# Patient Record
(demographics unavailable — no encounter records)

---

## 2025-04-23 NOTE — REVIEW OF SYSTEMS
[Negative] : Allergic/Immunologic [FreeTextEntry3] : green nasal discharge and congestion for 6 days

## 2025-04-23 NOTE — BEGINNING OF VISIT
[0] : 2) Feeling down, depressed, or hopeless: Not at all (0) [Former] : Former [20 or more] : 20 or more [Reviewed updated] : Reviewed updated [de-identified] : smoked for 20 years

## 2025-04-23 NOTE — PHYSICAL EXAM
[Fully active, able to carry on all pre-disease performance without restriction] : Status 0 - Fully active, able to carry on all pre-disease performance without restriction [Cachectic] : cachectic [Normal] : affect appropriate [de-identified] : nasal turbinates red with green nasal discharge  [de-identified] : R cervical LN palpable

## 2025-04-23 NOTE — HISTORY OF PRESENT ILLNESS
[de-identified] : Mrs.Luzelena Martínez is a 64 year old female who presents to the office today for pancytopenia, referred . The patient was getting her annual checked up and labs done at Dr.Mitchell Driscoll's office. The patient's labs were drawn on 2021 revealed: Hgb: 8.8, HCT: 28.5, WBC: 1.25, Plt: 144. The patient can not recall the last time she had a check up.  placed the patient on oral iron 1 month ago due to the her low Hgb.  Patient denies shortness of breath, dizziness, nausea, more fatigue than usual, bleeding disorders or vomiting. 15 lb weight loss noted  Last mammogram: unknown Last pap smear: unknown  Age of Menarche: 12 years old: normal LMP: 48 years old  OCP: none HRT: none  [de-identified] : Patient presents for follow up of LGL.  She reports feeling well overall and tolerates MTX 12.5mg weekly and prednisone 5mg daily. Feeling well overall Had Dexa - 3/24 - reviewed - osteoporosis still with Sinus infection

## 2025-04-23 NOTE — PHYSICAL EXAM
[Fully active, able to carry on all pre-disease performance without restriction] : Status 0 - Fully active, able to carry on all pre-disease performance without restriction [Cachectic] : cachectic [Normal] : affect appropriate [de-identified] : nasal turbinates red with green nasal discharge  [de-identified] : R cervical LN palpable

## 2025-04-23 NOTE — REASON FOR VISIT
[Follow-Up Visit] : a follow-up visit for [Family Member] : family member [FreeTextEntry2] : pancytopenia

## 2025-04-23 NOTE — PHYSICAL EXAM
[Fully active, able to carry on all pre-disease performance without restriction] : Status 0 - Fully active, able to carry on all pre-disease performance without restriction [Cachectic] : cachectic [Normal] : affect appropriate [de-identified] : nasal turbinates red with green nasal discharge  [de-identified] : R cervical LN palpable

## 2025-04-23 NOTE — ASSESSMENT
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   #Osteoporosis in setting of chronic steroid use discussed ca++ and vit D as well as fosamax - she has dentures. discussed side effects  #MARTA - On PO ferrous sulfate with dark stools  - Repeat irons today wnl  #Seropositive RA  - Follows with Dr. Weston. Wants to transition closer to home -reviewed note form 3/24  #Smoker  - Advised cessation  #Health Maintenance - She should get a mammo and [facility]  - She continues to not want to pursue mammo, [facility]  RTC 6 months with cbc with diff, cmp, methotrexate levels, LDH, ESR/CRP, iron, ferrtin, B12, Folate
negative

## 2025-04-23 NOTE — PHYSICAL EXAM
[Fully active, able to carry on all pre-disease performance without restriction] : Status 0 - Fully active, able to carry on all pre-disease performance without restriction [Cachectic] : cachectic [Normal] : affect appropriate [de-identified] : nasal turbinates red with green nasal discharge  [de-identified] : R cervical LN palpable

## 2025-04-23 NOTE — HISTORY OF PRESENT ILLNESS
[de-identified] : Mrs.Luzelena Martínez is a 64 year old female who presents to the office today for pancytopenia, referred . The patient was getting her annual checked up and labs done at Dr.Mitchell Driscoll's office. The patient's labs were drawn on 2021 revealed: Hgb: 8.8, HCT: 28.5, WBC: 1.25, Plt: 144. The patient can not recall the last time she had a check up.  placed the patient on oral iron 1 month ago due to the her low Hgb.  Patient denies shortness of breath, dizziness, nausea, more fatigue than usual, bleeding disorders or vomiting. 15 lb weight loss noted  Last mammogram: unknown Last pap smear: unknown  Age of Menarche: 12 years old: normal LMP: 48 years old  OCP: none HRT: none  [de-identified] : Patient presents for follow up of LGL.  She reports feeling well overall and tolerates MTX 12.5mg weekly and prednisone 5mg daily. Feeling well overall Had Dexa - 3/24 - reviewed - osteoporosis still with Sinus infection

## 2025-04-23 NOTE — HISTORY OF PRESENT ILLNESS
[de-identified] : Mrs.Luzelena Martínez is a 64 year old female who presents to the office today for pancytopenia, referred . The patient was getting her annual checked up and labs done at Dr.Mitchell Driscoll's office. The patient's labs were drawn on 2021 revealed: Hgb: 8.8, HCT: 28.5, WBC: 1.25, Plt: 144. The patient can not recall the last time she had a check up.  placed the patient on oral iron 1 month ago due to the her low Hgb.  Patient denies shortness of breath, dizziness, nausea, more fatigue than usual, bleeding disorders or vomiting. 15 lb weight loss noted  Last mammogram: unknown Last pap smear: unknown  Age of Menarche: 12 years old: normal LMP: 48 years old  OCP: none HRT: none  [de-identified] : Patient presents for follow up of LGL.  She reports feeling well overall and tolerates MTX 12.5mg weekly and prednisone 5mg daily. Feeling well overall Had Dexa - 3/24 - reviewed - osteoporosis still with Sinus infection

## 2025-04-23 NOTE — BEGINNING OF VISIT
[0] : 2) Feeling down, depressed, or hopeless: Not at all (0) [Former] : Former [20 or more] : 20 or more [Reviewed updated] : Reviewed updated [de-identified] : smoked for 20 years

## 2025-04-23 NOTE — BEGINNING OF VISIT
[0] : 2) Feeling down, depressed, or hopeless: Not at all (0) [Former] : Former [20 or more] : 20 or more [Reviewed updated] : Reviewed updated [de-identified] : smoked for 20 years

## 2025-04-23 NOTE — BEGINNING OF VISIT
[0] : 2) Feeling down, depressed, or hopeless: Not at all (0) [Former] : Former [20 or more] : 20 or more [Reviewed updated] : Reviewed updated [de-identified] : smoked for 20 years

## 2025-04-23 NOTE — HISTORY OF PRESENT ILLNESS
[de-identified] : Mrs.Luzelena Martínez is a 64 year old female who presents to the office today for pancytopenia, referred . The patient was getting her annual checked up and labs done at Dr.Mitchell Driscoll's office. The patient's labs were drawn on 2021 revealed: Hgb: 8.8, HCT: 28.5, WBC: 1.25, Plt: 144. The patient can not recall the last time she had a check up.  placed the patient on oral iron 1 month ago due to the her low Hgb.  Patient denies shortness of breath, dizziness, nausea, more fatigue than usual, bleeding disorders or vomiting. 15 lb weight loss noted  Last mammogram: unknown Last pap smear: unknown  Age of Menarche: 12 years old: normal LMP: 48 years old  OCP: none HRT: none  [de-identified] : Patient presents for follow up of LGL.  She reports feeling well overall and tolerates MTX 12.5mg weekly and prednisone 5mg daily. Feeling well overall Had Dexa - 3/24 - reviewed - osteoporosis still with Sinus infection

## 2025-04-23 NOTE — BEGINNING OF VISIT
[0] : 2) Feeling down, depressed, or hopeless: Not at all (0) [Former] : Former [20 or more] : 20 or more [Reviewed updated] : Reviewed updated [de-identified] : smoked for 20 years

## 2025-04-23 NOTE — PHYSICAL EXAM
[Fully active, able to carry on all pre-disease performance without restriction] : Status 0 - Fully active, able to carry on all pre-disease performance without restriction [Cachectic] : cachectic [Normal] : affect appropriate [de-identified] : nasal turbinates red with green nasal discharge  [de-identified] : R cervical LN palpable

## 2025-04-23 NOTE — HISTORY OF PRESENT ILLNESS
[de-identified] : Mrs.Luzelena Martínez is a 64 year old female who presents to the office today for pancytopenia, referred . The patient was getting her annual checked up and labs done at Dr.Mitchell Driscoll's office. The patient's labs were drawn on 2021 revealed: Hgb: 8.8, HCT: 28.5, WBC: 1.25, Plt: 144. The patient can not recall the last time she had a check up.  placed the patient on oral iron 1 month ago due to the her low Hgb.  Patient denies shortness of breath, dizziness, nausea, more fatigue than usual, bleeding disorders or vomiting. 15 lb weight loss noted  Last mammogram: unknown Last pap smear: unknown  Age of Menarche: 12 years old: normal LMP: 48 years old  OCP: none HRT: none  [de-identified] : Patient presents for follow up of LGL.  She reports feeling well overall and tolerates MTX 12.5mg weekly and prednisone 5mg daily. Feeling well overall Had Dexa - 3/24 - reviewed - osteoporosis still with Sinus infection